# Patient Record
Sex: MALE | URBAN - METROPOLITAN AREA
[De-identification: names, ages, dates, MRNs, and addresses within clinical notes are randomized per-mention and may not be internally consistent; named-entity substitution may affect disease eponyms.]

---

## 2017-01-25 ENCOUNTER — TELEPHONE (OUTPATIENT)
Dept: DIABETES | Facility: CLINIC | Age: 52
End: 2017-01-25

## 2017-01-25 NOTE — TELEPHONE ENCOUNTER
----- Message from Esperanza Ofe sent at 1/24/2017  4:08 PM CST -----  Contact: Nate tel:    [phone]   Pt.says he had an appt. W/ you yesterday and left his wallet in your office.    As[facility] for a return call from you or your [facility] This.

## 2017-01-25 NOTE — TELEPHONE ENCOUNTER
Pt called and left voicemail regarding lost wallet. Returned call - pt did not leave any belongings in my office. He stated he may have left it at his brother's house. Offered to check with  and hospital security - pt stated he already contacted both.